# Patient Record
Sex: MALE | Race: WHITE
[De-identification: names, ages, dates, MRNs, and addresses within clinical notes are randomized per-mention and may not be internally consistent; named-entity substitution may affect disease eponyms.]

---

## 2019-01-12 ENCOUNTER — HOSPITAL ENCOUNTER (INPATIENT)
Dept: HOSPITAL 61 - ER | Age: 64
LOS: 4 days | Discharge: NURSING, REHAB OR LONG-TERM CARE | DRG: 895 | End: 2019-01-16
Attending: HOSPITALIST | Admitting: HOSPITALIST
Payer: MEDICARE

## 2019-01-12 VITALS — DIASTOLIC BLOOD PRESSURE: 78 MMHG | SYSTOLIC BLOOD PRESSURE: 148 MMHG

## 2019-01-12 VITALS — DIASTOLIC BLOOD PRESSURE: 79 MMHG | SYSTOLIC BLOOD PRESSURE: 147 MMHG

## 2019-01-12 VITALS — BODY MASS INDEX: 19.62 KG/M2 | HEIGHT: 67 IN | WEIGHT: 125 LBS

## 2019-01-12 DIAGNOSIS — F10.239: Primary | ICD-10-CM

## 2019-01-12 DIAGNOSIS — G93.41: ICD-10-CM
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
## 2019-01-12 DIAGNOSIS — Z72.0: ICD-10-CM

## 2019-01-12 DIAGNOSIS — E83.42: ICD-10-CM

## 2019-01-12 DIAGNOSIS — Z87.820: ICD-10-CM

## 2019-01-12 LAB
ACETAMIN: < 2 MCG/ML (ref 10–30)
ALBUMIN SERPL-MCNC: 4.1 G/DL (ref 3.4–5)
ALBUMIN/GLOB SERPL: 1 {RATIO} (ref 1–1.7)
ALP SERPL-CCNC: 73 U/L (ref 46–116)
ALT SERPL-CCNC: 26 U/L (ref 16–63)
AMPHETAMINE/METHAMPHETAMINE: (no result)
ANION GAP SERPL CALC-SCNC: 12 MMOL/L (ref 6–14)
APTT PPP: (no result) S
AST SERPL-CCNC: 44 U/L (ref 15–37)
BACTERIA #/AREA URNS HPF: 0 /HPF
BARBITURATES UR-MCNC: (no result) UG/ML
BASOPHILS # BLD AUTO: 0 X10^3/UL (ref 0–0.2)
BASOPHILS NFR BLD: 1 % (ref 0–3)
BENZODIAZ UR-MCNC: (no result) UG/L
BILIRUB SERPL-MCNC: 3.2 MG/DL (ref 0.2–1)
BILIRUB UR QL STRIP: (no result)
BUN SERPL-MCNC: 12 MG/DL (ref 8–26)
BUN/CREAT SERPL: 15 (ref 6–20)
CALCIUM SERPL-MCNC: 9.3 MG/DL (ref 8.5–10.1)
CANNABINOIDS UR-MCNC: (no result) UG/L
CHLORIDE SERPL-SCNC: 97 MMOL/L (ref 98–107)
CO2 SERPL-SCNC: 29 MMOL/L (ref 21–32)
COCAINE UR-MCNC: (no result) NG/ML
CREAT SERPL-MCNC: 0.8 MG/DL (ref 0.7–1.3)
EOSINOPHIL NFR BLD: 0.2 X10^3/UL (ref 0–0.7)
EOSINOPHIL NFR BLD: 3 % (ref 0–3)
ERYTHROCYTE [DISTWIDTH] IN BLOOD BY AUTOMATED COUNT: 13.5 % (ref 11.5–14.5)
FIBRINOGEN PPP-MCNC: CLEAR MG/DL
GFR SERPLBLD BASED ON 1.73 SQ M-ARVRAT: 97.3 ML/MIN
GLOBULIN SER-MCNC: 4.2 G/DL (ref 2.2–3.8)
GLUCOSE SERPL-MCNC: 79 MG/DL (ref 70–99)
HCT VFR BLD CALC: 48.8 % (ref 39–53)
HGB BLD-MCNC: 16.8 G/DL (ref 13–17.5)
HYALINE CASTS #/AREA URNS LPF: (no result) /HPF
LYMPHOCYTES # BLD: 0.9 X10^3/UL (ref 1–4.8)
LYMPHOCYTES NFR BLD AUTO: 14 % (ref 24–48)
MAGNESIUM SERPL-MCNC: 1.5 MG/DL (ref 1.8–2.4)
MCH RBC QN AUTO: 32 PG (ref 25–35)
MCHC RBC AUTO-ENTMCNC: 34 G/DL (ref 31–37)
MCV RBC AUTO: 94 FL (ref 79–100)
METHADONE SERPL-MCNC: (no result) NG/ML
MONO #: 0.8 X10^3/UL (ref 0–1.1)
MONOCYTES NFR BLD: 13 % (ref 0–9)
NEUT #: 4.6 X10^3UL (ref 1.8–7.7)
NEUTROPHILS NFR BLD AUTO: 70 % (ref 31–73)
NITRITE UR QL STRIP: NEGATIVE
OPIATES UR-MCNC: (no result) NG/ML
PCP SERPL-MCNC: (no result) MG/DL
PH UR STRIP: 5.5 [PH]
PLATELET # BLD AUTO: 278 X10^3/UL (ref 140–400)
POTASSIUM SERPL-SCNC: 4 MMOL/L (ref 3.5–5.1)
PROT SERPL-MCNC: 8.3 G/DL (ref 6.4–8.2)
PROT UR STRIP-MCNC: NEGATIVE MG/DL
RBC # BLD AUTO: 5.22 X10^6/UL (ref 4.3–5.7)
RBC #/AREA URNS HPF: 0 /HPF (ref 0–2)
SALIC: < 2.8 MG/DL (ref 2.8–20)
SODIUM SERPL-SCNC: 138 MMOL/L (ref 136–145)
SQUAMOUS #/AREA URNS LPF: (no result) /LPF
UROBILINOGEN UR-MCNC: 1 MG/DL
WBC # BLD AUTO: 6.6 X10^3/UL (ref 4–11)
WBC #/AREA URNS HPF: (no result) /HPF (ref 0–4)

## 2019-01-12 PROCEDURE — 87040 BLOOD CULTURE FOR BACTERIA: CPT

## 2019-01-12 PROCEDURE — 80329 ANALGESICS NON-OPIOID 1 OR 2: CPT

## 2019-01-12 PROCEDURE — G0480 DRUG TEST DEF 1-7 CLASSES: HCPCS

## 2019-01-12 PROCEDURE — 72125 CT NECK SPINE W/O DYE: CPT

## 2019-01-12 PROCEDURE — 70450 CT HEAD/BRAIN W/O DYE: CPT

## 2019-01-12 PROCEDURE — 84484 ASSAY OF TROPONIN QUANT: CPT

## 2019-01-12 PROCEDURE — 80053 COMPREHEN METABOLIC PANEL: CPT

## 2019-01-12 PROCEDURE — 96374 THER/PROPH/DIAG INJ IV PUSH: CPT

## 2019-01-12 PROCEDURE — 80076 HEPATIC FUNCTION PANEL: CPT

## 2019-01-12 PROCEDURE — G0378 HOSPITAL OBSERVATION PER HR: HCPCS

## 2019-01-12 PROCEDURE — 85025 COMPLETE CBC W/AUTO DIFF WBC: CPT

## 2019-01-12 PROCEDURE — 71045 X-RAY EXAM CHEST 1 VIEW: CPT

## 2019-01-12 PROCEDURE — 96361 HYDRATE IV INFUSION ADD-ON: CPT

## 2019-01-12 PROCEDURE — 80048 BASIC METABOLIC PNL TOTAL CA: CPT

## 2019-01-12 PROCEDURE — 80307 DRUG TEST PRSMV CHEM ANLYZR: CPT

## 2019-01-12 PROCEDURE — 83605 ASSAY OF LACTIC ACID: CPT

## 2019-01-12 PROCEDURE — 81001 URINALYSIS AUTO W/SCOPE: CPT

## 2019-01-12 PROCEDURE — 83735 ASSAY OF MAGNESIUM: CPT

## 2019-01-12 PROCEDURE — 90471 IMMUNIZATION ADMIN: CPT

## 2019-01-12 PROCEDURE — 90756 CCIIV4 VACC ABX FREE IM: CPT

## 2019-01-12 PROCEDURE — 36415 COLL VENOUS BLD VENIPUNCTURE: CPT

## 2019-01-12 PROCEDURE — 93005 ELECTROCARDIOGRAM TRACING: CPT

## 2019-01-12 RX ADMIN — MAGNESIUM OXIDE TAB 400 MG (241.3 MG ELEMENTAL MG) SCH MG: 400 (241.3 MG) TAB at 15:20

## 2019-01-12 NOTE — PHYS DOC
Adult General


Chief Complaint


Chief Complaint:  OTHER COMPLAINTS





HPI


HPI





63-year-old male presents to ER via EMS from local [facility] for complaints of 

generalized weakness. Patient is poor historian and during exam becomes tearful 

and provides minimal information on past history or why he is in the ER. Pt has 

strong smell of urine on him during initial exam. Patient reports he drinks 

vodka


And denies any past medical history or surgeries. Patient is alert to self and 

is aware of bleeding at [facility]. Patient has tremors in 

bilateral upper extremities which increased during exam as he becomes more 

anxious and tearful.





Review of Systems


Review of Systems





Constitutional: Denies fever


HENT: Denies head pain


Respiratory: Denies shortness of breath []


Cardiovascular: Denies CP


GI: Denies abdominal pain, vomiting


Musculoskeletal: Reports bodyaches


Neurologic: Denies dizziness








Poor historian





Current Medications


Current Medications





Current Medications








 Medications


  (Trade)  Dose


 Ordered  Sig/Jacques  Start Time


 Stop Time Status Last Admin


Dose Admin


 


 Ceftriaxone Sodium


  (Rocephin)  1 gm  1X  ONCE  1/12/19 15:15


 1/12/19 15:16 DC 1/12/19 15:20


1 GM


 


 Magnesium Oxide


  (Magnesium Oxide)  400 mg  DAILY  1/12/19 15:08


    1/12/19 15:20


400 MG


 


 Sodium Chloride  1,000 ml @ 


 1,000 mls/hr  1X  ONCE  1/12/19 15:15


 1/12/19 16:14 DC 1/12/19 15:20


1,000 MLS/HR











Allergies


Allergies





Allergies








Coded Allergies Type Severity Reaction Last Updated Verified


 


  No Known Drug Allergies    1/12/19 No











Physical Exam


Physical Exam





Constitutional: Well developed, well nourished, no acute distress, non-toxic 

appearance. Frail fatigued appearance. Strong smell of urine. 2 full and 

bilateral upper extremity tremors which increased during exam.


HENT: Normocephalic, atraumatic, bilateral external ears normal, mucous 

membranes pink/dry, nose normal. []


Eyes: 2mm PERRLA, no nystagmus, conjunctiva normal, no discharge. [] 


Neck: Normal range of motion, no tenderness, supple, no stridor. [] 


Cardiovascular: Heart rate regular rhythm, no murmur []


Lungs & Thorax:  Bilateral breath sounds clear to auscultation. Resp. equal/

nonlabored


Abdomen: Bowel sounds normal, soft, no tenderness


Skin: Cool, dry, no erythema, no rash. [] 


Back: No tenderness, no CVA tenderness. [] 


Extremities: No tenderness, skin bilateral lower extremities with cool skin, no 

clubbing, ROM intact, no edema. [] 


Neurologic: Alert and oriented X 2-unaware of time or year, normal motor 

function, normal sensory function, no focal deficits noted. The most bilateral 

upper extremities


Psychologic: Affect normal, judgement normal, tearful





Current Patient Data


Vital Signs





 Vital Signs








  Date Time  Temp Pulse Resp B/P (MAP) Pulse Ox O2 Delivery O2 Flow Rate FiO2


 


1/12/19 15:30  122 19 158/74 (102) 96 Room Air  


 


1/12/19 13:20 98.1       





 98.1       








Lab Values





 Laboratory Tests








Test


 1/12/19


13:30


 


White Blood Count


 6.6 x10^3/uL


(4.0-11.0)


 


Red Blood Count


 5.22 x10^6/uL


(4.30-5.70)


 


Hemoglobin


 16.8 g/dL


(13.0-17.5)


 


Hematocrit


 48.8 %


(39.0-53.0)


 


Mean Corpuscular Volume


 94 fL ()





 


Mean Corpuscular Hemoglobin 32 pg (25-35)  


 


Mean Corpuscular Hemoglobin


Concent 34 g/dL


(31-37)


 


Red Cell Distribution Width


 13.5 %


(11.5-14.5)


 


Platelet Count


 278 x10^3/uL


(140-400)


 


Neutrophils (%) (Auto) 70 % (31-73)  


 


Lymphocytes (%) (Auto) 14 % (24-48)  L


 


Monocytes (%) (Auto) 13 % (0-9)  H


 


Eosinophils (%) (Auto) 3 % (0-3)  


 


Basophils (%) (Auto) 1 % (0-3)  


 


Neutrophils # (Auto)


 4.6 x10^3uL


(1.8-7.7)


 


Lymphocytes # (Auto)


 0.9 x10^3/uL


(1.0-4.8)  L


 


Monocytes # (Auto)


 0.8 x10^3/uL


(0.0-1.1)


 


Eosinophils # (Auto)


 0.2 x10^3/uL


(0.0-0.7)


 


Basophils # (Auto)


 0.0 x10^3/uL


(0.0-0.2)


 


Sodium Level


 138 mmol/L


(136-145)


 


Potassium Level


 4.0 mmol/L


(3.5-5.1)


 


Chloride Level


 97 mmol/L


()  L


 


Carbon Dioxide Level


 29 mmol/L


(21-32)


 


Anion Gap 12 (6-14)  


 


Blood Urea Nitrogen


 12 mg/dL


(8-26)


 


Creatinine


 0.8 mg/dL


(0.7-1.3)


 


Estimated GFR


(Cockcroft-Gault) 97.3  





 


BUN/Creatinine Ratio 15 (6-20)  


 


Glucose Level


 79 mg/dL


(70-99)


 


Lactic Acid Level


 4.0 mmol/L


(0.4-2.0)  *H


 


Calcium Level


 9.3 mg/dL


(8.5-10.1)


 


Magnesium Level


 1.5 mg/dL


(1.8-2.4)  L


 


Total Bilirubin


 3.2 mg/dL


(0.2-1.0)  H


 


Aspartate Amino Transferase


(AST) 44 U/L (15-37)


H


 


Alanine Aminotransferase (ALT)


 26 U/L (16-63)





 


Alkaline Phosphatase


 73 U/L


()


 


Troponin I Quantitative


 < 0.017 ng/mL


(0.000-0.055)


 


Total Protein


 8.3 g/dL


(6.4-8.2)  H


 


Albumin


 4.1 g/dL


(3.4-5.0)


 


Albumin/Globulin Ratio 1.0 (1.0-1.7)  


 


Salicylates Level


 < 2.8 mg/dL


(2.8-20.0)  L


 


Salicylate Last Dose Date Unk  


 


Salicylate Last Dose Time Unk  


 


Acetaminophen Level


 < 2 mcg/ml


(10-30)  L


 


Acetaminophen Last Dose Date Unk  


 


Acetaminophen Last Dose Time Unk  


 


Ethyl Alcohol Level


 60 mg/dL


(0-10)  H





 Laboratory Tests


1/12/19 13:30








 Laboratory Tests


1/12/19 13:30











EKG


EKG


EKG obtained 01/12/19 at 1344


Interpreted by Dr. Davis





Sinus tachycardia


QTc 458


Rate 110


No STEMI





Radiology/Procedures


Radiology/Procedures


PROCEDURE: CHEST AP ONLY





CHEST AP ONLY


 


Clinical Indication: WEAKNESS


 


Comparison:  None.


 


Findings: 


Atherosclerotic aortic arch. Cardiac size is normal. Lungs are probably 


hyperexpanded. Lungs are clear. There is no pneumothorax. No pleural 


effusion is appreciated. No acute bone abnormality. There is old left 


lateral ninth rib fracture. Old right posterior lateral sixth rib 


fracture.


 


IMPRESSION: 


No acute cardiopulmonary process.


 


 


Electronically signed by: Ethan Amezcua MD (1/12/2019 1:43 PM) [facility]














DICTATED and SIGNED BY:     ETHAN AMEZCUA MD


DATE:     01/12/19 5495








PROCEDURE: CT HEAD AND CERVICAL SPINE [facility] Compliance Statement:


 


One or more of the following individualized dose reduction techniques were


utilized for this examination:  


1. Automated exposure control  


2. Adjustment of the mA and/or kV according to patient size  


3. Use of iterative reconstruction technique


 


 


CT HEAD AND CERVICAL SPINE WITHOUT CONTRAST


 


History: ams, weakness, poor historian.


 


Comparison: None.


 


Procedure: Axial images are obtained of the head from the skull base 


through the vertex without IV contrast. Noncontrast helical CT of the 


cervical spine was performed.  Axial, sagittal, and coronal 


reconstructions were obtained. 


 


Findings: 


The ventricles and sulci are prominent, consistent with age-related 


cerebral atrophy.  There is periventricular white matter hypoattenuation.


This is a nonspecific finding but is commonly due to chronic small vessel 


ischemic disease in a patient of this age.


 


No mass-effect, midline shift, hemorrhage or obvious acute infarction is 


identified.  Basilar cisterns are patent.  


 


Bone windows demonstrate no significant calvarial abnormality. 


The visualized paranasal sinuses are clear. Mastoid air cells are well 


aerated. 


 


There is no evidence of acute fracture or acute malalignment of the 


cervical spine. 


 


Craniovertebral junction is maintained. Hypertrophic facet joints. No 


perched or jumped facets. There is disc space narrowing and degenerative 


endplate spurring in the cervical spine. Uncinate process hypertrophy 


contributes to multilevel neural foraminal narrowing, some levels are 


severe.


 


Visualized soft tissues of the neck demonstrate no significant 


abnormalities. The visualized lung apices are clear.


 


IMPRESSION:


1.  No acute intracranial abnormality.  


2.  No acute fracture of the cervical spine.


 


Electronically signed by: Ethan Amezcua MD (1/12/2019 2:32 PM) [facility]














DICTATED and SIGNED BY:     ETHAN AMEZCUA MD


DATE:     01/12/19 1422





Course & Med Decision Making


Course & Med Decision Making


Pertinent Labs and Imaging studies reviewed. (See chart for details)





1500: Patient was brought to the ER via EMS from local [facility] and provides 

minimal information on past medical history or if having any current symptoms. 

Patient was tearful during initial exam and answered minimal questions. Patient 

had a head and C-spine CT which were negative for acute findings; chest x-ray 

no acute findings. EKG with sinus tachycardia no ischemic changes troponin <

0.017; WBCs normal limits at 6.6 with no bands lactic acid 4.0- 2L NS IV flds 

given. Blood cxs were obtained. Mg level at 1.5 provide oral replacement while 

in the ER. Total bilirubin 3.2 with patient reporting he is a daily drinker of 

vodka. Current alcohol level 60. With no source of infection found with current 

tests discussed pt's case and plan of care with Dr. Davis- will provide pt 

with dose of IV Rocephin and admit for further care/monitoring. Patient has 

been provided with lunch tray and is sitting up in bed eating. Patient 

continues to provide minimal past medical history he does deny allergies to 

medicine, daily medications, or past medical history uncertain of accuracy of 

this information. Patient is currently attempting to provide UA.





1530: Spoke with Dr. Vasquez, hospitalist and discussed pt's case, plan of care

, and admit plan. Will admit to their services for further care to the [facility] 

floor for further monitoring. Pt has had no seizure like activity while in ER 

or change in MS. Uncertain if pt has had previous hx of seizures with etoh 

withdrawal- he does continue to have tremors in bilat. upper extremities. 





Had requested UA be obtained prior to pt going to admit room- RN reported UA 

had not been obtained and that pt had been transported to  [address]. [facility] RN 

reports she advise accepting RN that UA was needed. 








Staff Physician Addendum:


I was working in the ER during the course of this patient's visit.  I was 

available for consultation as needed, but I was not directly involved in the 

care of this patient.





Dragon Disclaimer


Dragon Disclaimer


This electronic medical record was generated, in whole or in part, using a 

voice recognition dictation system.





Departure


Departure


Impression:  


 Primary Impression:  


 Weakness


 Additional Impression:  


 ETOH abuse


Disposition:  09 ADMITTED AS INPATIENT


Admitting Physician:  Other (Dr. Vasquez)


Condition:  STABLE





Problem Qualifiers











MARIKA AZEVEDO Jan 12, 2019 13:37


JEIMY DAVIS MD Jan 13, 2019 07:43

## 2019-01-12 NOTE — RAD
CHEST AP ONLY

 

Clinical Indication: WEAKNESS

 

Comparison:  None.

 

Findings: 

Atherosclerotic aortic arch. Cardiac size is normal. Lungs are probably 

hyperexpanded. Lungs are clear. There is no pneumothorax. No pleural 

effusion is appreciated. No acute bone abnormality. There is old left 

lateral ninth rib fracture. Old right posterior lateral sixth rib 

fracture.

 

IMPRESSION: 

No acute cardiopulmonary process.

 

 

Electronically signed by: Ethan Amezcua MD (1/12/2019 1:43 PM) [facility]
[employer] Compliance Statement:

 

One or more of the following individualized dose reduction techniques were

utilized for this examination:  

1. Automated exposure control  

2. Adjustment of the mA and/or kV according to patient size  

3. Use of iterative reconstruction technique

 

 

CT HEAD AND CERVICAL SPINE WITHOUT CONTRAST

 

History: ams, weakness, poor historian.

 

Comparison: None.

 

Procedure: Axial images are obtained of the head from the skull base 

through the vertex without IV contrast. Noncontrast helical CT of the 

cervical spine was performed.  Axial, sagittal, and coronal 

reconstructions were obtained. 

 

Findings: 

The ventricles and sulci are prominent, consistent with age-related 

cerebral atrophy.  There is periventricular white matter hypoattenuation.

This is a nonspecific finding but is commonly due to chronic small vessel 

ischemic disease in a patient of this age.

 

No mass-effect, midline shift, hemorrhage or obvious acute infarction is 

identified.  Basilar cisterns are patent.  

 

Bone windows demonstrate no significant calvarial abnormality. 

The visualized paranasal sinuses are clear. Mastoid air cells are well 

aerated. 

 

There is no evidence of acute fracture or acute malalignment of the 

cervical spine. 

 

Craniovertebral junction is maintained. Hypertrophic facet joints. No 

perched or jumped facets. There is disc space narrowing and degenerative 

endplate spurring in the cervical spine. Uncinate process hypertrophy 

contributes to multilevel neural foraminal narrowing, some levels are 

severe.

 

Visualized soft tissues of the neck demonstrate no significant 

abnormalities. The visualized lung apices are clear.

 

IMPRESSION:

1.  No acute intracranial abnormality.  

2.  No acute fracture of the cervical spine.

 

Electronically signed by: Ethan Amezcua MD (1/12/2019 2:32 PM) [facility]
no

## 2019-01-13 VITALS — DIASTOLIC BLOOD PRESSURE: 75 MMHG | SYSTOLIC BLOOD PRESSURE: 144 MMHG

## 2019-01-13 VITALS — DIASTOLIC BLOOD PRESSURE: 76 MMHG | SYSTOLIC BLOOD PRESSURE: 133 MMHG

## 2019-01-13 VITALS — DIASTOLIC BLOOD PRESSURE: 75 MMHG | SYSTOLIC BLOOD PRESSURE: 138 MMHG

## 2019-01-13 VITALS — SYSTOLIC BLOOD PRESSURE: 146 MMHG | DIASTOLIC BLOOD PRESSURE: 76 MMHG

## 2019-01-13 VITALS — SYSTOLIC BLOOD PRESSURE: 134 MMHG | DIASTOLIC BLOOD PRESSURE: 63 MMHG

## 2019-01-13 VITALS — DIASTOLIC BLOOD PRESSURE: 76 MMHG | SYSTOLIC BLOOD PRESSURE: 134 MMHG

## 2019-01-13 LAB
ALBUMIN SERPL-MCNC: 3.3 G/DL (ref 3.4–5)
ALP SERPL-CCNC: 58 U/L (ref 46–116)
ALT SERPL-CCNC: 19 U/L (ref 16–63)
ANION GAP SERPL CALC-SCNC: 11 MMOL/L (ref 6–14)
AST SERPL-CCNC: 28 U/L (ref 15–37)
BASOPHILS # BLD AUTO: 0 X10^3/UL (ref 0–0.2)
BASOPHILS NFR BLD: 0 % (ref 0–3)
BILIRUB DIRECT SERPL-MCNC: 0.2 MG/DL (ref 0–0.2)
BILIRUB SERPL-MCNC: 3.7 MG/DL (ref 0.2–1)
BUN SERPL-MCNC: 13 MG/DL (ref 8–26)
CALCIUM SERPL-MCNC: 9 MG/DL (ref 8.5–10.1)
CHLORIDE SERPL-SCNC: 100 MMOL/L (ref 98–107)
CO2 SERPL-SCNC: 27 MMOL/L (ref 21–32)
CREAT SERPL-MCNC: 0.7 MG/DL (ref 0.7–1.3)
EOSINOPHIL NFR BLD: 0.2 X10^3/UL (ref 0–0.7)
EOSINOPHIL NFR BLD: 2 % (ref 0–3)
ERYTHROCYTE [DISTWIDTH] IN BLOOD BY AUTOMATED COUNT: 13.6 % (ref 11.5–14.5)
GFR SERPLBLD BASED ON 1.73 SQ M-ARVRAT: 113.5 ML/MIN
GLUCOSE SERPL-MCNC: 75 MG/DL (ref 70–99)
HCT VFR BLD CALC: 39 % (ref 39–53)
HGB BLD-MCNC: 13.9 G/DL (ref 13–17.5)
LYMPHOCYTES # BLD: 0.7 X10^3/UL (ref 1–4.8)
LYMPHOCYTES NFR BLD AUTO: 8 % (ref 24–48)
MCH RBC QN AUTO: 33 PG (ref 25–35)
MCHC RBC AUTO-ENTMCNC: 36 G/DL (ref 31–37)
MCV RBC AUTO: 93 FL (ref 79–100)
MONO #: 0.9 X10^3/UL (ref 0–1.1)
MONOCYTES NFR BLD: 12 % (ref 0–9)
NEUT #: 6.1 X10^3UL (ref 1.8–7.7)
NEUTROPHILS NFR BLD AUTO: 77 % (ref 31–73)
PLATELET # BLD AUTO: 218 X10^3/UL (ref 140–400)
POTASSIUM SERPL-SCNC: 3.1 MMOL/L (ref 3.5–5.1)
PROT SERPL-MCNC: 6.5 G/DL (ref 6.4–8.2)
RBC # BLD AUTO: 4.18 X10^6/UL (ref 4.3–5.7)
SODIUM SERPL-SCNC: 138 MMOL/L (ref 136–145)
WBC # BLD AUTO: 7.9 X10^3/UL (ref 4–11)

## 2019-01-13 RX ADMIN — BACITRACIN SCH MLS/HR: 5000 INJECTION, POWDER, FOR SOLUTION INTRAMUSCULAR at 01:00

## 2019-01-13 RX ADMIN — MAGNESIUM OXIDE TAB 400 MG (241.3 MG ELEMENTAL MG) SCH MG: 400 (241.3 MG) TAB at 08:56

## 2019-01-13 RX ADMIN — FOLIC ACID SCH MLS/HR: 5 INJECTION, SOLUTION INTRAMUSCULAR; INTRAVENOUS; SUBCUTANEOUS at 14:15

## 2019-01-13 RX ADMIN — BACITRACIN SCH MLS/HR: 5000 INJECTION, POWDER, FOR SOLUTION INTRAMUSCULAR at 12:10

## 2019-01-13 NOTE — PDOC1
History and Physical


History of Present Illness


History of Present Illness


From ED: 63-year-old male presents to ER via EMS from local [facility] for 

complaints of generalized weakness. Patient is poor historian and during exam 

becomes tearful and provides minimal information on past history or why he is 

in the ER. Pt has strong smell of urine on him during initial exam. Patient 

reports he drinks vodka. And denies any past medical history or surgeries. 

Patient is alert to self and is aware of bleeding at [facility]. 

Patient has tremors in bilateral upper extremities which increased during exam 

as he becomes more anxious and tearful.








On my assessment pt is noted to have been sent to the ED after a wellness check 

after he was found to not leave the hotel for extended amts of time. He was 

found to have malodorous urine and low Mag in the ED and was given Rocephin and 

Mag. LA initially 4 and repeat was < 1. He is NAD but does not remember where 

he leaves or if/what medications he is taking.  ETOH level is 60 on admission. 

He has  h/o TBI as well. He is noted to have a persistent tremor in the RUE





Current Problem List


Problem List


Problems


Medical Problems:


(1) ETOH abuse


Status: Acute  











Current Medications


Current Medications





Current Medications








 Medications


  (Trade)  Dose


 Ordered  Sig/Jacques  Start Time


 Stop Time Status Last Admin


Dose Admin


 


 Ceftriaxone Sodium


  (Rocephin)  1 gm  1X  ONCE  1/12/19 15:15


 1/12/19 15:16 DC 1/12/19 15:20


1 GM


 


 Magnesium Oxide


  (Magnesium Oxide)  400 mg  DAILY  1/12/19 15:08


    1/13/19 08:56


400 MG


 


 Sodium Chloride  1,000 ml @ 


 75 mls/hr  [id number]  1/13/19 00:00


    1/13/19 12:10


75 MLS/HR











Allergies


Allergies





Allergies








Coded Allergies Type Severity Reaction Last Updated Verified


 


  No Known Drug Allergies    1/12/19 No











ROS


Review of System


CONSTITUTIONAL:        No fever or chills


EYES:                          No recent changes


SKIN:               No rash or itching


CARDIOVASCULAR:     No chest pain, syncope, palpitations, or edema


RESPIRATORY:            No SOB or cough


GASTROINTESTINAL:    No nausea, vomiting or abdominal pain


NEUROLOGICAL:          No headaches or weakness


ENDOCRINE:               No cold or heat intolerance


GENITOURINARY:         No urgency or frequency of urination


MUSCULOSKELETAL:   No back pain or joint pain


LYMPHATICS:               No enlarged lymph nodes


PSYCHIATRIC:              No anxiety or depression





Physical Exam


Physical Exam


GEN.:    No apparent distress.  Alert and oriented.


HEENT:    Head is normocephalic, atraumatic


NECK:    Supple.  


LUNGS:    Clear to auscultation.


HEART:    RRR, S1, S2 present.  Peripheral pulses intact


ABDOMEN:    Soft, nontender.  Positive bowel sounds.


EXTREMITIES:    Without any cyanosis.Tremor in RUE 


NEUROLOGIC:     Normal speech, normal tone


PSYCHIATRIC:    Normal affect, normal mood.


SKIN:   No ulcerations





Vitals


Vitals





Vital Signs








  Date Time  Temp Pulse Resp B/P (MAP) Pulse Ox O2 Delivery O2 Flow Rate FiO2


 


1/13/19 10:53 97.5 82 18 134/63 (86) 98 Nasal Cannula 2.0 





 97.5       











Labs


Labs





Laboratory Tests








Test


 1/12/19


13:30 1/12/19


19:15 1/13/19


00:00 1/13/19


01:30


 


White Blood Count


 6.6 x10^3/uL


(4.0-11.0) 


 


 7.9 x10^3/uL


(4.0-11.0)


 


Red Blood Count


 5.22 x10^6/uL


(4.30-5.70) 


 


 4.18 x10^6/uL


(4.30-5.70)


 


Hemoglobin


 16.8 g/dL


(13.0-17.5) 


 


 13.9 g/dL


(13.0-17.5)


 


Hematocrit


 48.8 %


(39.0-53.0) 


 


 39.0 %


(39.0-53.0)


 


Mean Corpuscular Volume 94 fL ()    93 fL () 


 


Mean Corpuscular Hemoglobin 32 pg (25-35)    33 pg (25-35) 


 


Mean Corpuscular Hemoglobin


Concent 34 g/dL


(31-37) 


 


 36 g/dL


(31-37)


 


Red Cell Distribution Width


 13.5 %


(11.5-14.5) 


 


 13.6 %


(11.5-14.5)


 


Platelet Count


 278 x10^3/uL


(140-400) 


 


 218 x10^3/uL


(140-400)


 


Neutrophils (%) (Auto) 70 % (31-73)    77 % (31-73) 


 


Lymphocytes (%) (Auto) 14 % (24-48)    8 % (24-48) 


 


Monocytes (%) (Auto) 13 % (0-9)    12 % (0-9) 


 


Eosinophils (%) (Auto) 3 % (0-3)    2 % (0-3) 


 


Basophils (%) (Auto) 1 % (0-3)    0 % (0-3) 


 


Neutrophils # (Auto)


 4.6 x10^3uL


(1.8-7.7) 


 


 6.1 x10^3uL


(1.8-7.7)


 


Lymphocytes # (Auto)


 0.9 x10^3/uL


(1.0-4.8) 


 


 0.7 x10^3/uL


(1.0-4.8)


 


Monocytes # (Auto)


 0.8 x10^3/uL


(0.0-1.1) 


 


 0.9 x10^3/uL


(0.0-1.1)


 


Eosinophils # (Auto)


 0.2 x10^3/uL


(0.0-0.7) 


 


 0.2 x10^3/uL


(0.0-0.7)


 


Basophils # (Auto)


 0.0 x10^3/uL


(0.0-0.2) 


 


 0.0 x10^3/uL


(0.0-0.2)


 


Sodium Level


 138 mmol/L


(136-145) 


 


 138 mmol/L


(136-145)


 


Potassium Level


 4.0 mmol/L


(3.5-5.1) 


 


 3.1 mmol/L


(3.5-5.1)


 


Chloride Level


 97 mmol/L


() 


 


 100 mmol/L


()


 


Carbon Dioxide Level


 29 mmol/L


(21-32) 


 


 27 mmol/L


(21-32)


 


Anion Gap 12 (6-14)    11 (6-14) 


 


Blood Urea Nitrogen


 12 mg/dL


(8-26) 


 


 13 mg/dL


(8-26)


 


Creatinine


 0.8 mg/dL


(0.7-1.3) 


 


 0.7 mg/dL


(0.7-1.3)


 


Estimated GFR


(Cockcroft-Gault) 97.3 


 


 


 113.5 





 


BUN/Creatinine Ratio 15 (6-20)    


 


Glucose Level


 79 mg/dL


(70-99) 


 


 75 mg/dL


(70-99)


 


Lactic Acid Level


 4.0 mmol/L


(0.4-2.0) 


 0.7 mmol/L


(0.4-2.0) 





 


Calcium Level


 9.3 mg/dL


(8.5-10.1) 


 


 9.0 mg/dL


(8.5-10.1)


 


Magnesium Level


 1.5 mg/dL


(1.8-2.4) 


 


 





 


Total Bilirubin


 3.2 mg/dL


(0.2-1.0) 


 


 





 


Aspartate Amino Transf


(AST/SGOT) 44 U/L (15-37) 


 


 


 





 


Alanine Aminotransferase


(ALT/SGPT) 26 U/L (16-63) 


 


 


 





 


Alkaline Phosphatase


 73 U/L


() 


 


 





 


Troponin I Quantitative


 < 0.017 ng/mL


(0.000-0.055) 


 


 





 


Total Protein


 8.3 g/dL


(6.4-8.2) 


 


 





 


Albumin


 4.1 g/dL


(3.4-5.0) 


 


 





 


Albumin/Globulin Ratio 1.0 (1.0-1.7)    


 


Salicylates Level


 < 2.8 mg/dL


(2.8-20.0) 


 


 





 


Salicylate Last Dose Date Unk    


 


Salicylate Last Dose Time Unk    


 


Acetaminophen Level


 < 2 mcg/ml


(10-30) 


 


 





 


Acetaminophen Last Dose Date Unk    


 


Acetaminophen Last Dose Time Unk    


 


Ethyl Alcohol Level


 60 mg/dL


(0-10) 


 


 





 


Urine Collection Type  Clean catch   


 


Urine Color  Dk yellow   


 


Urine Clarity  Clear   


 


Urine pH  5.5   


 


Urine Specific Gravity  1.025   


 


Urine Protein


 


 Negative mg/dL


(NEG-TRACE) 


 





 


Urine Glucose (UA)


 


 Negative mg/dL


(NEG) 


 





 


Urine Ketones (Stick)  15 mg/dL (NEG)   


 


Urine Blood  Negative (NEG)   


 


Urine Nitrite  Negative (NEG)   


 


Urine Bilirubin  Small (NEG)   


 


Urine Urobilinogen Dipstick


 


 1.0 mg/dL (0.2


mg/dL) 


 





 


Urine Leukocyte Esterase  Negative (NEG)   


 


Urine RBC  0 /HPF (0-2)   


 


Urine WBC


 


 Rare /HPF


(0-4) 


 





 


Urine Squamous Epithelial


Cells 


 Few /LPF 


 


 





 


Urine Bacteria  0 /HPF (0-FEW)   


 


Urine Hyaline Casts  Few /HPF   


 


Urine Mucus  Marked /LPF   


 


Urine Opiates Screen  Neg (NEG)   


 


Urine Methadone Screen  Neg (NEG)   


 


Urine Barbiturates  Neg (NEG)   


 


Urine Phencyclidine Screen  Neg (NEG)   


 


Urine


Amphetamine/Methamphetamine 


 Neg (NEG) 


 


 





 


Urine Benzodiazepines Screen  Neg (NEG)   


 


Urine Cocaine Screen  Neg (NEG)   


 


Urine Cannabinoids Screen  Neg (NEG)   


 


Urine Ethyl Alcohol  Pos (NEG)   








Laboratory Tests








Test


 1/12/19


13:30 1/12/19


19:15 1/13/19


00:00 1/13/19


01:30


 


White Blood Count


 6.6 x10^3/uL


(4.0-11.0) 


 


 7.9 x10^3/uL


(4.0-11.0)


 


Red Blood Count


 5.22 x10^6/uL


(4.30-5.70) 


 


 4.18 x10^6/uL


(4.30-5.70)


 


Hemoglobin


 16.8 g/dL


(13.0-17.5) 


 


 13.9 g/dL


(13.0-17.5)


 


Hematocrit


 48.8 %


(39.0-53.0) 


 


 39.0 %


(39.0-53.0)


 


Mean Corpuscular Volume 94 fL ()    93 fL () 


 


Mean Corpuscular Hemoglobin 32 pg (25-35)    33 pg (25-35) 


 


Mean Corpuscular Hemoglobin


Concent 34 g/dL


(31-37) 


 


 36 g/dL


(31-37)


 


Red Cell Distribution Width


 13.5 %


(11.5-14.5) 


 


 13.6 %


(11.5-14.5)


 


Platelet Count


 278 x10^3/uL


(140-400) 


 


 218 x10^3/uL


(140-400)


 


Neutrophils (%) (Auto) 70 % (31-73)    77 % (31-73) 


 


Lymphocytes (%) (Auto) 14 % (24-48)    8 % (24-48) 


 


Monocytes (%) (Auto) 13 % (0-9)    12 % (0-9) 


 


Eosinophils (%) (Auto) 3 % (0-3)    2 % (0-3) 


 


Basophils (%) (Auto) 1 % (0-3)    0 % (0-3) 


 


Neutrophils # (Auto)


 4.6 x10^3uL


(1.8-7.7) 


 


 6.1 x10^3uL


(1.8-7.7)


 


Lymphocytes # (Auto)


 0.9 x10^3/uL


(1.0-4.8) 


 


 0.7 x10^3/uL


(1.0-4.8)


 


Monocytes # (Auto)


 0.8 x10^3/uL


(0.0-1.1) 


 


 0.9 x10^3/uL


(0.0-1.1)


 


Eosinophils # (Auto)


 0.2 x10^3/uL


(0.0-0.7) 


 


 0.2 x10^3/uL


(0.0-0.7)


 


Basophils # (Auto)


 0.0 x10^3/uL


(0.0-0.2) 


 


 0.0 x10^3/uL


(0.0-0.2)


 


Sodium Level


 138 mmol/L


(136-145) 


 


 138 mmol/L


(136-145)


 


Potassium Level


 4.0 mmol/L


(3.5-5.1) 


 


 3.1 mmol/L


(3.5-5.1)


 


Chloride Level


 97 mmol/L


() 


 


 100 mmol/L


()


 


Carbon Dioxide Level


 29 mmol/L


(21-32) 


 


 27 mmol/L


(21-32)


 


Anion Gap 12 (6-14)    11 (6-14) 


 


Blood Urea Nitrogen


 12 mg/dL


(8-26) 


 


 13 mg/dL


(8-26)


 


Creatinine


 0.8 mg/dL


(0.7-1.3) 


 


 0.7 mg/dL


(0.7-1.3)


 


Estimated GFR


(Cockcroft-Gault) 97.3 


 


 


 113.5 





 


BUN/Creatinine Ratio 15 (6-20)    


 


Glucose Level


 79 mg/dL


(70-99) 


 


 75 mg/dL


(70-99)


 


Lactic Acid Level


 4.0 mmol/L


(0.4-2.0) 


 0.7 mmol/L


(0.4-2.0) 





 


Calcium Level


 9.3 mg/dL


(8.5-10.1) 


 


 9.0 mg/dL


(8.5-10.1)


 


Magnesium Level


 1.5 mg/dL


(1.8-2.4) 


 


 





 


Total Bilirubin


 3.2 mg/dL


(0.2-1.0) 


 


 





 


Aspartate Amino Transf


(AST/SGOT) 44 U/L (15-37) 


 


 


 





 


Alanine Aminotransferase


(ALT/SGPT) 26 U/L (16-63) 


 


 


 





 


Alkaline Phosphatase


 73 U/L


() 


 


 





 


Troponin I Quantitative


 < 0.017 ng/mL


(0.000-0.055) 


 


 





 


Total Protein


 8.3 g/dL


(6.4-8.2) 


 


 





 


Albumin


 4.1 g/dL


(3.4-5.0) 


 


 





 


Albumin/Globulin Ratio 1.0 (1.0-1.7)    


 


Salicylates Level


 < 2.8 mg/dL


(2.8-20.0) 


 


 





 


Salicylate Last Dose Date Unk    


 


Salicylate Last Dose Time Unk    


 


Acetaminophen Level


 < 2 mcg/ml


(10-30) 


 


 





 


Acetaminophen Last Dose Date Unk    


 


Acetaminophen Last Dose Time Unk    


 


Ethyl Alcohol Level


 60 mg/dL


(0-10) 


 


 





 


Urine Collection Type  Clean catch   


 


Urine Color  Dk yellow   


 


Urine Clarity  Clear   


 


Urine pH  5.5   


 


Urine Specific Gravity  1.025   


 


Urine Protein


 


 Negative mg/dL


(NEG-TRACE) 


 





 


Urine Glucose (UA)


 


 Negative mg/dL


(NEG) 


 





 


Urine Ketones (Stick)  15 mg/dL (NEG)   


 


Urine Blood  Negative (NEG)   


 


Urine Nitrite  Negative (NEG)   


 


Urine Bilirubin  Small (NEG)   


 


Urine Urobilinogen Dipstick


 


 1.0 mg/dL (0.2


mg/dL) 


 





 


Urine Leukocyte Esterase  Negative (NEG)   


 


Urine RBC  0 /HPF (0-2)   


 


Urine WBC


 


 Rare /HPF


(0-4) 


 





 


Urine Squamous Epithelial


Cells 


 Few /LPF 


 


 





 


Urine Bacteria  0 /HPF (0-FEW)   


 


Urine Hyaline Casts  Few /HPF   


 


Urine Mucus  Marked /LPF   


 


Urine Opiates Screen  Neg (NEG)   


 


Urine Methadone Screen  Neg (NEG)   


 


Urine Barbiturates  Neg (NEG)   


 


Urine Phencyclidine Screen  Neg (NEG)   


 


Urine


Amphetamine/Methamphetamine 


 Neg (NEG) 


 


 





 


Urine Benzodiazepines Screen  Neg (NEG)   


 


Urine Cocaine Screen  Neg (NEG)   


 


Urine Cannabinoids Screen  Neg (NEG)   


 


Urine Ethyl Alcohol  Pos (NEG)   











VTE Prophylaxis Ordered


VTE Prophylaxis Devices:  No


VTE Pharmacological Prophylaxi:  No





Assessment/Plan


Assessment/Plan


Plan: 


UA not convincing for UTI 


DC Rocephin 


IVF 


Mag prn 


Banana Bag 


CIWA al 


Social work c/s











BRITTANY DAVEY MD Jan 13, 2019 13:34

## 2019-01-14 VITALS — SYSTOLIC BLOOD PRESSURE: 127 MMHG | DIASTOLIC BLOOD PRESSURE: 71 MMHG

## 2019-01-14 VITALS — DIASTOLIC BLOOD PRESSURE: 92 MMHG | SYSTOLIC BLOOD PRESSURE: 127 MMHG

## 2019-01-14 VITALS — DIASTOLIC BLOOD PRESSURE: 80 MMHG | SYSTOLIC BLOOD PRESSURE: 140 MMHG

## 2019-01-14 VITALS — DIASTOLIC BLOOD PRESSURE: 87 MMHG | SYSTOLIC BLOOD PRESSURE: 144 MMHG

## 2019-01-14 VITALS — DIASTOLIC BLOOD PRESSURE: 72 MMHG | SYSTOLIC BLOOD PRESSURE: 129 MMHG

## 2019-01-14 VITALS — DIASTOLIC BLOOD PRESSURE: 50 MMHG | SYSTOLIC BLOOD PRESSURE: 124 MMHG

## 2019-01-14 VITALS — SYSTOLIC BLOOD PRESSURE: 132 MMHG | DIASTOLIC BLOOD PRESSURE: 85 MMHG

## 2019-01-14 RX ADMIN — BACITRACIN SCH MLS/HR: 5000 INJECTION, POWDER, FOR SOLUTION INTRAMUSCULAR at 03:00

## 2019-01-14 RX ADMIN — BACITRACIN SCH MLS/HR: 5000 INJECTION, POWDER, FOR SOLUTION INTRAMUSCULAR at 16:00

## 2019-01-14 RX ADMIN — MAGNESIUM OXIDE TAB 400 MG (241.3 MG ELEMENTAL MG) SCH MG: 400 (241.3 MG) TAB at 08:28

## 2019-01-14 RX ADMIN — FOLIC ACID SCH MLS/HR: 5 INJECTION, SOLUTION INTRAMUSCULAR; INTRAVENOUS; SUBCUTANEOUS at 09:00

## 2019-01-14 NOTE — PDOC
PROGRESS NOTES


Chief Complaint


Chief Complaint


weakness and debility


alcohol abuse,  acute EtOH withdrawl and encephalopathy


hypomag, hypo kalememia


acute metabolic encephalopathy


 


HR above 90,  cont thiamine and folate


OOB to chair


transfer off CV tele unit to 6





History of Present Illness


History of Present Illness


tremor at rest,   some PO intake





Vitals


Vitals





Vital Signs








  Date Time  Temp Pulse Resp B/P (MAP) Pulse Ox O2 Delivery O2 Flow Rate FiO2


 


1/14/19 08:15      Room Air  


 


1/14/19 07:00 97.5 78 18 129/72 (91) 95   





 97.5       


 


1/13/19 19:45       2.0 











Physical Exam


General:  Alert, Oriented X3, Cooperative, Other


Heart:  Normal S1, Normal S2


Lungs:  Wheezing


Abdomen:  Normal bowel sounds


Extremities:  No clubbing, No cyanosis


Skin:  No breakdown





Review of Systems


Review of Systems


nausea


weakness, tremor





Assessment and Plan


Assessmemt and Plan


Problems


Medical Problems:


(1) ETOH abuse


Status: Acute  











Comment


Review of Relevant


I have reviewed the following items dash (where applicable) has been applied.


Labs





Laboratory Tests








Test


 1/12/19


13:30 1/12/19


19:15 1/13/19


00:00 1/13/19


01:30


 


White Blood Count


 6.6 x10^3/uL


(4.0-11.0) 


 


 7.9 x10^3/uL


(4.0-11.0)


 


Red Blood Count


 5.22 x10^6/uL


(4.30-5.70) 


 


 4.18 x10^6/uL


(4.30-5.70)


 


Hemoglobin


 16.8 g/dL


(13.0-17.5) 


 


 13.9 g/dL


(13.0-17.5)


 


Hematocrit


 48.8 %


(39.0-53.0) 


 


 39.0 %


(39.0-53.0)


 


Mean Corpuscular Volume 94 fL ()    93 fL () 


 


Mean Corpuscular Hemoglobin 32 pg (25-35)    33 pg (25-35) 


 


Mean Corpuscular Hemoglobin


Concent 34 g/dL


(31-37) 


 


 36 g/dL


(31-37)


 


Red Cell Distribution Width


 13.5 %


(11.5-14.5) 


 


 13.6 %


(11.5-14.5)


 


Platelet Count


 278 x10^3/uL


(140-400) 


 


 218 x10^3/uL


(140-400)


 


Neutrophils (%) (Auto) 70 % (31-73)    77 % (31-73) 


 


Lymphocytes (%) (Auto) 14 % (24-48)    8 % (24-48) 


 


Monocytes (%) (Auto) 13 % (0-9)    12 % (0-9) 


 


Eosinophils (%) (Auto) 3 % (0-3)    2 % (0-3) 


 


Basophils (%) (Auto) 1 % (0-3)    0 % (0-3) 


 


Neutrophils # (Auto)


 4.6 x10^3uL


(1.8-7.7) 


 


 6.1 x10^3uL


(1.8-7.7)


 


Lymphocytes # (Auto)


 0.9 x10^3/uL


(1.0-4.8) 


 


 0.7 x10^3/uL


(1.0-4.8)


 


Monocytes # (Auto)


 0.8 x10^3/uL


(0.0-1.1) 


 


 0.9 x10^3/uL


(0.0-1.1)


 


Eosinophils # (Auto)


 0.2 x10^3/uL


(0.0-0.7) 


 


 0.2 x10^3/uL


(0.0-0.7)


 


Basophils # (Auto)


 0.0 x10^3/uL


(0.0-0.2) 


 


 0.0 x10^3/uL


(0.0-0.2)


 


Sodium Level


 138 mmol/L


(136-145) 


 


 138 mmol/L


(136-145)


 


Potassium Level


 4.0 mmol/L


(3.5-5.1) 


 


 3.1 mmol/L


(3.5-5.1)


 


Chloride Level


 97 mmol/L


() 


 


 100 mmol/L


()


 


Carbon Dioxide Level


 29 mmol/L


(21-32) 


 


 27 mmol/L


(21-32)


 


Anion Gap 12 (6-14)    11 (6-14) 


 


Blood Urea Nitrogen


 12 mg/dL


(8-26) 


 


 13 mg/dL


(8-26)


 


Creatinine


 0.8 mg/dL


(0.7-1.3) 


 


 0.7 mg/dL


(0.7-1.3)


 


Estimated GFR


(Cockcroft-Gault) 97.3 


 


 


 113.5 





 


BUN/Creatinine Ratio 15 (6-20)    


 


Glucose Level


 79 mg/dL


(70-99) 


 


 75 mg/dL


(70-99)


 


Lactic Acid Level


 4.0 mmol/L


(0.4-2.0) 


 0.7 mmol/L


(0.4-2.0) 





 


Calcium Level


 9.3 mg/dL


(8.5-10.1) 


 


 9.0 mg/dL


(8.5-10.1)


 


Magnesium Level


 1.5 mg/dL


(1.8-2.4) 


 


 





 


Total Bilirubin


 3.2 mg/dL


(0.2-1.0) 


 


 3.7 mg/dL


(0.2-1.0)


 


Aspartate Amino Transf


(AST/SGOT) 44 U/L (15-37) 


 


 


 28 U/L (15-37) 





 


Alanine Aminotransferase


(ALT/SGPT) 26 U/L (16-63) 


 


 


 19 U/L (16-63) 





 


Alkaline Phosphatase


 73 U/L


() 


 


 58 U/L


()


 


Troponin I Quantitative


 < 0.017 ng/mL


(0.000-0.055) 


 


 





 


Total Protein


 8.3 g/dL


(6.4-8.2) 


 


 6.5 g/dL


(6.4-8.2)


 


Albumin


 4.1 g/dL


(3.4-5.0) 


 


 3.3 g/dL


(3.4-5.0)


 


Albumin/Globulin Ratio 1.0 (1.0-1.7)    


 


Salicylates Level


 < 2.8 mg/dL


(2.8-20.0) 


 


 





 


Salicylate Last Dose Date Unk    


 


Salicylate Last Dose Time Unk    


 


Acetaminophen Level


 < 2 mcg/ml


(10-30) 


 


 





 


Acetaminophen Last Dose Date Unk    


 


Acetaminophen Last Dose Time Unk    


 


Ethyl Alcohol Level


 60 mg/dL


(0-10) 


 


 





 


Urine Collection Type  Clean catch   


 


Urine Color  Dk yellow   


 


Urine Clarity  Clear   


 


Urine pH  5.5   


 


Urine Specific Gravity  1.025   


 


Urine Protein


 


 Negative mg/dL


(NEG-TRACE) 


 





 


Urine Glucose (UA)


 


 Negative mg/dL


(NEG) 


 





 


Urine Ketones (Stick)  15 mg/dL (NEG)   


 


Urine Blood  Negative (NEG)   


 


Urine Nitrite  Negative (NEG)   


 


Urine Bilirubin  Small (NEG)   


 


Urine Urobilinogen Dipstick


 


 1.0 mg/dL (0.2


mg/dL) 


 





 


Urine Leukocyte Esterase  Negative (NEG)   


 


Urine RBC  0 /HPF (0-2)   


 


Urine WBC


 


 Rare /HPF


(0-4) 


 





 


Urine Squamous Epithelial


Cells 


 Few /LPF 


 


 





 


Urine Bacteria  0 /HPF (0-FEW)   


 


Urine Hyaline Casts  Few /HPF   


 


Urine Mucus  Marked /LPF   


 


Urine Opiates Screen  Neg (NEG)   


 


Urine Methadone Screen  Neg (NEG)   


 


Urine Barbiturates  Neg (NEG)   


 


Urine Phencyclidine Screen  Neg (NEG)   


 


Urine


Amphetamine/Methamphetamine 


 Neg (NEG) 


 


 





 


Urine Benzodiazepines Screen  Neg (NEG)   


 


Urine Cocaine Screen  Neg (NEG)   


 


Urine Cannabinoids Screen  Neg (NEG)   


 


Urine Ethyl Alcohol  Pos (NEG)   


 


Direct Bilirubin


 


 


 


 0.2 mg/dL


(0.0-0.2)








Microbiology


1/12/19 Blood Culture - Preliminary, Resulted


          NO GROWTH AFTER 1 DAY


Medications





Current Medications


Sodium Chloride 1,000 ml @  1,000 mls/hr 1X  ONCE IV  Last administered on 1/12/ 19at 14:05;  Start 1/12/19 at 13:30;  Stop 1/12/19 at 14:29;  Status DC


Sodium Chloride 1,000 ml @  1,000 mls/hr 1X  ONCE IV  Last administered on 1/12/ 19at 15:20;  Start 1/12/19 at 15:15;  Stop 1/12/19 at 16:14;  Status DC


Ceftriaxone Sodium (Rocephin) 1 gm 1X  ONCE IVP  Last administered on 1/12/19at 

15:20;  Start 1/12/19 at 15:15;  Stop 1/12/19 at 15:16;  Status DC


Magnesium Oxide (Magnesium Oxide) 400 mg DAILY PO  Last administered on 1/14/ 19at 08:28;  Start 1/12/19 at 15:08


Sodium Chloride 1,000 ml @  75 mls/hr [id number] IV  Last administered on 1/14/ 19at 03:00;  Start 1/13/19 at 00:00


Potassium Chloride (Klor-Con) 40 meq 1X  ONCE PO  Last administered on 1/13/ 19at 14:14;  Start 1/13/19 at 13:30;  Stop 1/13/19 at 13:31;  Status DC


Multivitamins 10 ml/Thiamine HCl 100 mg/Folic Acid 1 mg/Sodium Chloride 1,011.2 

ml  @ 100 mls/ hr DAILY IV  Last administered on 1/14/19at 09:00;  Start 1/13/ 19 at 14:00;  Stop 1/17/19 at 19:07


Multivitamins (Thera M Plus) 1 tab DAILY PO ;  Start 1/18/19 at 09:00


Folic Acid (Folic Acid) 1 mg DAILY PO ;  Start 1/18/19 at 09:00


Thiamine HCl 100 mg/Dextrose 51 ml @  100 mls/hr DAILY IV ;  Start 1/18/19 at 09

:00;  Stop 1/22/19 at 09:31


Lorazepam (Ativan) 4 mg PRN Q1HR  PRN PO For CIWA 8-14 Last administered on 1/13 /19at 14:15;  Start 1/13/19 at 13:30


Lorazepam (Ativan) 8 mg PRN Q1HR  PRN PO For CIWA 15 or greater;  Start 1/13/19 

at 13:30


Info (FLU VACCINE SCREEN per RX) 1 each PRN 1X  PRN MC SEE COMMENTS;  Start 1/14 /19 at 10:15;  Status UNV


Influenza Virus Vaccine (Afluria Trivalent 9449-2800 Syringe) 0.5 ml ONCE ONCE 

VAX IM ;  Start 1/14/19 at 10:30;  Stop 1/14/19 at 10:31;  Status DC


Potassium Chloride (Klor-Con) 40 meq 1X  ONCE PO ;  Start 1/14/19 at 11:00;  

Stop 1/14/19 at 11:01


Potassium Chloride (Klor-Con) 20 meq DAILYWBKFT PO ;  Start 1/15/19 at 08:00


Magnesium Sulfate/ Dextrose 100 ml @  25 mls/hr 1X  ONCE IV ;  Start 1/14/19 at 

11:00;  Stop 1/14/19 at 14:59


Lorazepam (Ativan) 1 mg BID PO ;  Start 1/14/19 at 11:00


Vitals/I & O





Vital Sign - Last 24 Hours








 1/13/19 1/13/19 1/13/19 1/13/19





 10:53 14:48 19:00 19:45


 


Temp 97.5 98.5 97.6 





 97.5 98.5 97.6 


 


Pulse 82 70 99 


 


Resp 18 18 20 


 


B/P (MAP) 134/63 (86) 146/76 (99) 133/76 (95) 


 


Pulse Ox 98 96 97 


 


O2 Delivery Nasal Cannula Nasal Cannula Room Air Nasal Cannula


 


O2 Flow Rate 2.0 2.0  2.0


 


    





    





 1/13/19 1/14/19 1/14/19 1/14/19





 23:45 03:45 07:00 08:15


 


Temp 97.8 98.2 97.5 





 97.8 98.2 97.5 


 


Pulse 77 83 78 


 


Resp 18 18 18 


 


B/P (MAP) 134/76 (95) 140/80 (100) 129/72 (91) 


 


Pulse Ox 96 97 95 


 


O2 Delivery Room Air Room Air Room Air Room Air














Intake and Output   


 


 1/13/19 1/13/19 1/14/19





 15:01 23:01 07:01


 


Intake Total 540 ml 100 ml 1300 ml


 


Output Total 350 ml  250 ml


 


Balance 190 ml 100 ml 1050 ml

















HEIDY BELL MD Jan 14, 2019 10:56

## 2019-01-14 NOTE — NUR
Per pt's request, LESLEE met with pt. Pt was concerned he left his TV at the motel he was 
staying. SW informed pt to call the motel to inquire about his TV. Pt also reported having 
Medicare insurance and is interested in [facility]. LESLEE notified Magdalena COELHO regarding insurance 
status. LESLEE will continue to follow.

## 2019-01-15 VITALS — SYSTOLIC BLOOD PRESSURE: 130 MMHG | DIASTOLIC BLOOD PRESSURE: 85 MMHG

## 2019-01-15 VITALS — SYSTOLIC BLOOD PRESSURE: 132 MMHG | DIASTOLIC BLOOD PRESSURE: 93 MMHG

## 2019-01-15 VITALS — SYSTOLIC BLOOD PRESSURE: 126 MMHG | DIASTOLIC BLOOD PRESSURE: 80 MMHG

## 2019-01-15 VITALS — DIASTOLIC BLOOD PRESSURE: 102 MMHG | SYSTOLIC BLOOD PRESSURE: 133 MMHG

## 2019-01-15 VITALS — DIASTOLIC BLOOD PRESSURE: 75 MMHG | SYSTOLIC BLOOD PRESSURE: 117 MMHG

## 2019-01-15 VITALS — SYSTOLIC BLOOD PRESSURE: 172 MMHG | DIASTOLIC BLOOD PRESSURE: 79 MMHG

## 2019-01-15 LAB
ANION GAP SERPL CALC-SCNC: 8 MMOL/L (ref 6–14)
BUN SERPL-MCNC: 6 MG/DL (ref 8–26)
CALCIUM SERPL-MCNC: 8.7 MG/DL (ref 8.5–10.1)
CHLORIDE SERPL-SCNC: 101 MMOL/L (ref 98–107)
CO2 SERPL-SCNC: 29 MMOL/L (ref 21–32)
CREAT SERPL-MCNC: 0.6 MG/DL (ref 0.7–1.3)
GFR SERPLBLD BASED ON 1.73 SQ M-ARVRAT: 135.6 ML/MIN
GLUCOSE SERPL-MCNC: 89 MG/DL (ref 70–99)
MAGNESIUM SERPL-MCNC: 1.7 MG/DL (ref 1.8–2.4)
POTASSIUM SERPL-SCNC: 3.8 MMOL/L (ref 3.5–5.1)
SODIUM SERPL-SCNC: 138 MMOL/L (ref 136–145)

## 2019-01-15 PROCEDURE — HZ31ZZZ INDIVIDUAL COUNSELING FOR SUBSTANCE ABUSE TREATMENT, BEHAVIORAL: ICD-10-PCS | Performed by: INTERNAL MEDICINE

## 2019-01-15 RX ADMIN — POTASSIUM CHLORIDE SCH MEQ: 1500 TABLET, EXTENDED RELEASE ORAL at 08:54

## 2019-01-15 RX ADMIN — MAGNESIUM OXIDE TAB 400 MG (241.3 MG ELEMENTAL MG) SCH MG: 400 (241.3 MG) TAB at 08:54

## 2019-01-15 RX ADMIN — FOLIC ACID SCH MLS/HR: 5 INJECTION, SOLUTION INTRAMUSCULAR; INTRAVENOUS; SUBCUTANEOUS at 08:56

## 2019-01-15 RX ADMIN — BACITRACIN SCH MLS/HR: 5000 INJECTION, POWDER, FOR SOLUTION INTRAMUSCULAR at 05:54

## 2019-01-15 RX ADMIN — NICOTINE SCH PATCH: 21 PATCH, EXTENDED RELEASE TOPICAL at 15:41

## 2019-01-15 RX ADMIN — BACITRACIN SCH MLS/HR: 5000 INJECTION, POWDER, FOR SOLUTION INTRAMUSCULAR at 21:05

## 2019-01-15 NOTE — PDOC
PROGRESS NOTES


Chief Complaint


Chief Complaint


Weakness





History of Present Illness


History of Present Illness


Patient seen and examined this morning.  Had a distinct smell of urine and his 

jeans were hanging off the side of the hospital bed.  Discussed that his choice 

of drink is vodka and he has struggle with alcoholism for some time.  Counseled 

patient on importance of cessation.





Vitals


Vitals





Vital Signs








  Date Time  Temp Pulse Resp B/P (MAP) Pulse Ox O2 Delivery O2 Flow Rate FiO2


 


1/15/19 11:00 97.9 69 18 126/80 (95) 94 Room Air  





 97.9       


 


1/15/19 08:00       2.0 











Physical Exam


General:  Alert, Oriented X3, Cooperative, Other


Heart:  Normal S1, Normal S2


Lungs:  Wheezing


Abdomen:  Normal bowel sounds


Extremities:  No clubbing, No cyanosis


Skin:  No breakdown





Labs


LABS





Laboratory Tests








Test


 1/15/19


05:28


 


Sodium Level


 138 mmol/L


(136-145)


 


Potassium Level


 3.8 mmol/L


(3.5-5.1)


 


Chloride Level


 101 mmol/L


()


 


Carbon Dioxide Level


 29 mmol/L


(21-32)


 


Anion Gap 8 (6-14) 


 


Blood Urea Nitrogen 6 mg/dL (8-26) 


 


Creatinine


 0.6 mg/dL


(0.7-1.3)


 


Estimated GFR


(Cockcroft-Gault) 135.6 





 


Glucose Level


 89 mg/dL


(70-99)


 


Calcium Level


 8.7 mg/dL


(8.5-10.1)


 


Magnesium Level


 1.7 mg/dL


(1.8-2.4)











Review of Systems


Review of Systems


Heart: denies chest pain


Lung: denies soa


GI: denies nausea/vomiting





Assessment and Plan


Assessmemt and Plan


Assessment:


1. Alcohol withdrawal


2. Tobacco use





Plan:


1. SNU eval, patient stated he desires to go to nursing home


2. Discharge disposition pending


3. Banana bag 


4.  home meds


5. PT/OT











Comment


Review of Relevant


I have reviewed the following items dash (where applicable) has been applied.


Labs





Laboratory Tests








Test


 1/15/19


05:28


 


Sodium Level


 138 mmol/L


(136-145)


 


Potassium Level


 3.8 mmol/L


(3.5-5.1)


 


Chloride Level


 101 mmol/L


()


 


Carbon Dioxide Level


 29 mmol/L


(21-32)


 


Anion Gap 8 (6-14) 


 


Blood Urea Nitrogen 6 mg/dL (8-26) 


 


Creatinine


 0.6 mg/dL


(0.7-1.3)


 


Estimated GFR


(Cockcroft-Gault) 135.6 





 


Glucose Level


 89 mg/dL


(70-99)


 


Calcium Level


 8.7 mg/dL


(8.5-10.1)


 


Magnesium Level


 1.7 mg/dL


(1.8-2.4)








Laboratory Tests








Test


 1/15/19


05:28


 


Sodium Level


 138 mmol/L


(136-145)


 


Potassium Level


 3.8 mmol/L


(3.5-5.1)


 


Chloride Level


 101 mmol/L


()


 


Carbon Dioxide Level


 29 mmol/L


(21-32)


 


Anion Gap 8 (6-14) 


 


Blood Urea Nitrogen 6 mg/dL (8-26) 


 


Creatinine


 0.6 mg/dL


(0.7-1.3)


 


Estimated GFR


(Cockcroft-Gault) 135.6 





 


Glucose Level


 89 mg/dL


(70-99)


 


Calcium Level


 8.7 mg/dL


(8.5-10.1)


 


Magnesium Level


 1.7 mg/dL


(1.8-2.4)








Microbiology


1/12/19 Blood Culture - Preliminary, Resulted


          NO GROWTH AFTER 2 DAYS


Medications





Current Medications


Sodium Chloride 1,000 ml @  1,000 mls/hr 1X  ONCE IV  Last administered on 1/12/ 19at 14:05;  Start 1/12/19 at 13:30;  Stop 1/12/19 at 14:29;  Status DC


Sodium Chloride 1,000 ml @  1,000 mls/hr 1X  ONCE IV  Last administered on 1/12/ 19at 15:20;  Start 1/12/19 at 15:15;  Stop 1/12/19 at 16:14;  Status DC


Ceftriaxone Sodium (Rocephin) 1 gm 1X  ONCE IVP  Last administered on 1/12/19at 

15:20;  Start 1/12/19 at 15:15;  Stop 1/12/19 at 15:16;  Status DC


Magnesium Oxide (Magnesium Oxide) 400 mg DAILY PO  Last administered on 1/15/

19at 08:54;  Start 1/12/19 at 15:08


Sodium Chloride 1,000 ml @  75 mls/hr [id number] IV  Last administered on 1/15/

19at 05:54;  Start 1/13/19 at 00:00


Potassium Chloride (Klor-Con) 40 meq 1X  ONCE PO  Last administered on 1/13/ 19at 14:14;  Start 1/13/19 at 13:30;  Stop 1/13/19 at 13:31;  Status DC


Multivitamins 10 ml/Thiamine HCl 100 mg/Folic Acid 1 mg/Sodium Chloride 1,011.2 

ml  @ 100 mls/ hr DAILY IV  Last administered on 1/15/19at 08:56;  Start 1/13/ 19 at 14:00;  Stop 1/17/19 at 19:07


Multivitamins (Thera M Plus) 1 tab DAILY PO ;  Start 1/18/19 at 09:00


Folic Acid (Folic Acid) 1 mg DAILY PO ;  Start 1/18/19 at 09:00


Thiamine HCl 100 mg/Dextrose 51 ml @  100 mls/hr DAILY IV ;  Start 1/18/19 at 09

:00;  Stop 1/22/19 at 09:31


Lorazepam (Ativan) 4 mg PRN Q1HR  PRN PO For CIWA 8-14 Last administered on 1/15

/19at 03:21;  Start 1/13/19 at 13:30


Lorazepam (Ativan) 8 mg PRN Q1HR  PRN PO For CIWA 15 or greater;  Start 1/13/19 

at 13:30


Info (FLU VACCINE SCREEN per RX) 1 each PRN 1X  PRN MC SEE COMMENTS;  Start 1/14 /19 at 10:15;  Status UNV


Influenza Virus Vaccine (Afluria Trivalent 9768-9213 Syringe) 0.5 ml ONCE ONCE 

VAX IM  Last administered on 1/14/19at 11:02;  Start 1/14/19 at 10:30;  Stop 1/ 14/19 at 10:31;  Status DC


Potassium Chloride (Klor-Con) 40 meq 1X  ONCE PO  Last administered on 1/14/ 19at 11:00;  Start 1/14/19 at 11:00;  Stop 1/14/19 at 11:01;  Status DC


Potassium Chloride (Klor-Con) 20 meq DAILYWBKFT PO  Last administered on 1/15/

19at 08:54;  Start 1/15/19 at 08:00


Magnesium Sulfate/ Dextrose 100 ml @  25 mls/hr 1X  ONCE IV  Last administered 

on 1/14/19at 11:00;  Start 1/14/19 at 11:00;  Stop 1/14/19 at 14:59;  Status DC


Lorazepam (Ativan) 1 mg BID PO  Last administered on 1/15/19at 08:54;  Start 1/ 14/19 at 11:00


Vitals/I & O





Vital Sign - Last 24 Hours








 1/14/19 1/14/19 1/14/19 1/14/19





 11:37 14:46 19:00 20:00


 


Temp 98.0 98.1 97.5 





 98.0 98.1 97.5 


 


Pulse 97 90 103 


 


Resp 17 20 21 


 


B/P (MAP) 144/87 (106) 127/92 (104) 124/50 (74) 


 


Pulse Ox 97 96 97 


 


O2 Delivery Room Air Room Air Room Air Room Air


 


O2 Flow Rate    2.0


 


    





    





 1/14/19 1/15/19 1/15/19 1/15/19





 23:00 03:00 07:15 08:00


 


Temp 98.6 97.5 97.8 





 98.6 97.5 97.8 


 


Pulse 90 81 66 


 


Resp 20 17 20 


 


B/P (MAP) 132/85 (101) 132/93 (106) 117/75 (89) 


 


Pulse Ox 97 95 94 


 


O2 Delivery Room Air Room Air Room Air Room Air


 


O2 Flow Rate    2.0


 


    





    





 1/15/19   





 11:00   


 


Temp 97.9   





 97.9   


 


Pulse 69   


 


Resp 18   


 


B/P (MAP) 126/80 (95)   


 


Pulse Ox 94   


 


O2 Delivery Room Air   














Intake and Output   


 


 1/14/19 1/14/19 1/15/19





 15:01 23:01 07:01


 


Intake Total 240 ml 860 ml 1960 ml


 


Output Total 650 ml  1700 ml


 


Balance -410 ml 860 ml 260 ml

















ALTAGRACIA JAQUEZ III DO Juarez 15, 2019 11:38

## 2019-01-15 NOTE — EKG
[facility]

              [address] [place] [address]

Test Date:    2019               Test Time:    13:44:07

Pat Name:     SHILO DAVIS          Department:   

Patient ID:   [id number]           Room:         3 1

Gender:                                Technician:   

:          1955               Requested By: JEIMY ASENCIO

Order Number: [id number]           Reading MD:   Nikolas Norman MD

                                 Measurements

Intervals                              Axis          

Rate:                                  P:            

OR:                                    QRS:          

QRSD:                                  T:            

QT:                                                  

QTc:                                                 

                           Interpretive Statements

SR

BASELINE ARTIFACT



Electronically Signed On 1- 12:04:04 CST by Nikolas Norman MD

## 2019-01-15 NOTE — NUR
Pt frequently getting out of bed, stating that "I am bored" and "I want a beer." Visable arm 
tremors noted and CIWA reassessed and scored at 15. Per protocol, Ativan 8mg PO, given. Will 
continue to monitor.

## 2019-01-15 NOTE — NUR
SW following pt. Discussed with pt regarding SNU options and the concern of a safe dc after 
SNU. Pt plans to go back to motel after rehab but is concerned about his belongings that are 
at the motel right now. SW informed him to ask a friend or family to help him with moving 
his belonging to motel. Pt agreeable with being screened at various facilities as most SNU 
do not discharge patients to motel after rehab. Pt aware insurance will have to approve SNU 
if he is accepted clinically at a facility and might have to go back to motel if there are 
no accepting facilities. SW phoned and faxed referral to [facility], [facility], [facility] and OP center. Pt 
admission and acceptance pending. SW will continue to follow.

## 2019-01-16 VITALS — SYSTOLIC BLOOD PRESSURE: 126 MMHG | DIASTOLIC BLOOD PRESSURE: 92 MMHG

## 2019-01-16 VITALS — DIASTOLIC BLOOD PRESSURE: 81 MMHG | SYSTOLIC BLOOD PRESSURE: 112 MMHG

## 2019-01-16 RX ADMIN — POTASSIUM CHLORIDE SCH MEQ: 1500 TABLET, EXTENDED RELEASE ORAL at 09:21

## 2019-01-16 RX ADMIN — NICOTINE SCH PATCH: 21 PATCH, EXTENDED RELEASE TOPICAL at 09:21

## 2019-01-16 RX ADMIN — FOLIC ACID SCH MLS/HR: 5 INJECTION, SOLUTION INTRAMUSCULAR; INTRAVENOUS; SUBCUTANEOUS at 09:20

## 2019-01-16 RX ADMIN — MAGNESIUM OXIDE TAB 400 MG (241.3 MG ELEMENTAL MG) SCH MG: 400 (241.3 MG) TAB at 09:21

## 2019-01-16 RX ADMIN — BACITRACIN SCH MLS/HR: 5000 INJECTION, POWDER, FOR SOLUTION INTRAMUSCULAR at 08:00

## 2019-01-16 NOTE — NUR
This RN called called [facility] twice to give report and was put on hold for 20 
minutes without an answer.

## 2019-01-16 NOTE — NUR
LESLEE following pt. LESLEE phoned and faxed orders to OP center. Pt will transport via facility 
arranged transport at 1600. Pt aware of plan and agreeable. LESELE notified pt's son, Vish 
about plan. Packet on chart. DYLAN LIU.

## 2019-01-16 NOTE — NUR
Insurance has approved SNU. Physician notified for orders. Pt aware of plan and agreeable. 
SW will await for dc orders and proceed accordingly.

## 2019-01-16 NOTE — PDOC
PROGRESS NOTES


Chief Complaint


Chief Complaint


Weakness





History of Present Illness


History of Present Illness


Patient seen and examined this morning. He was sitting in his chair drinking 

coffee, appeared to have good dexterity. He is looking forward to being 

discharged to a skilled nursing unit. Discussed that his choice of drink is 

vodka and he has struggle with alcoholism for some time.  Counseled patient on 

importance of cessation.





Vitals


Vitals





Vital Signs








  Date Time  Temp Pulse Resp B/P (MAP) Pulse Ox O2 Delivery O2 Flow Rate FiO2


 


1/16/19 09:22  90  112/81    


 


1/16/19 08:00      Room Air  


 


1/16/19 07:00 98.1  18  96   





 98.1       


 


1/15/19 08:00       2.0 











Physical Exam


General:  Alert, Oriented X3, Cooperative, No acute distress, Other


Heart:  Regular rate, Normal S1, Normal S2


Lungs:  Wheezing


Abdomen:  Normal bowel sounds


Extremities:  No clubbing, No cyanosis


Skin:  No breakdown





Review of Systems


Review of Systems


Heart: denies chest pain


Lung: denies soa


Integument: denies rash





Assessment and Plan


Assessmemt and Plan


Assessment


1. ETOH abuse  





Plan:


1. PT/OT


2. Home meds


3. ETOH withdrawal protocol


4. Discharge to SNU today


5. Discharge disposition pending








Comment


Review of Relevant


I have reviewed the following items dash (where applicable) has been applied.


Labs





Laboratory Tests








Test


 1/15/19


05:28


 


Sodium Level


 138 mmol/L


(136-145)


 


Potassium Level


 3.8 mmol/L


(3.5-5.1)


 


Chloride Level


 101 mmol/L


()


 


Carbon Dioxide Level


 29 mmol/L


(21-32)


 


Anion Gap 8 (6-14) 


 


Blood Urea Nitrogen 6 mg/dL (8-26) 


 


Creatinine


 0.6 mg/dL


(0.7-1.3)


 


Estimated GFR


(Cockcroft-Gault) 135.6 





 


Glucose Level


 89 mg/dL


(70-99)


 


Calcium Level


 8.7 mg/dL


(8.5-10.1)


 


Magnesium Level


 1.7 mg/dL


(1.8-2.4)








Microbiology


1/12/19 Blood Culture - Preliminary, Resulted


          NO GROWTH AFTER 3 DAYS


Medications





Current Medications


Sodium Chloride 1,000 ml @  1,000 mls/hr 1X  ONCE IV  Last administered on 1/12/ 19at 14:05;  Start 1/12/19 at 13:30;  Stop 1/12/19 at 14:29;  Status DC


Sodium Chloride 1,000 ml @  1,000 mls/hr 1X  ONCE IV  Last administered on 1/12/ 19at 15:20;  Start 1/12/19 at 15:15;  Stop 1/12/19 at 16:14;  Status DC


Ceftriaxone Sodium (Rocephin) 1 gm 1X  ONCE IVP  Last administered on 1/12/19at 

15:20;  Start 1/12/19 at 15:15;  Stop 1/12/19 at 15:16;  Status DC


Magnesium Oxide (Magnesium Oxide) 400 mg DAILY PO  Last administered on 1/16/ 19at 09:21;  Start 1/12/19 at 15:08


Sodium Chloride 1,000 ml @  75 mls/hr [id number] IV  Last administered on 1/15/

19at 21:05;  Start 1/13/19 at 00:00


Potassium Chloride (Klor-Con) 40 meq 1X  ONCE PO  Last administered on 1/13/ 19at 14:14;  Start 1/13/19 at 13:30;  Stop 1/13/19 at 13:31;  Status DC


Multivitamins 10 ml/Thiamine HCl 100 mg/Folic Acid 1 mg/Sodium Chloride 1,011.2 

ml  @ 100 mls/ hr DAILY IV  Last administered on 1/16/19at 09:20;  Start 1/13/ 19 at 14:00;  Stop 1/17/19 at 19:07


Multivitamins (Thera M Plus) 1 tab DAILY PO ;  Start 1/18/19 at 09:00


Folic Acid (Folic Acid) 1 mg DAILY PO ;  Start 1/18/19 at 09:00


Thiamine HCl 100 mg/Dextrose 51 ml @  100 mls/hr DAILY IV ;  Start 1/18/19 at 09

:00;  Stop 1/22/19 at 09:31


Lorazepam (Ativan) 4 mg PRN Q1HR  PRN PO For CIWA 8-14 Last administered on 1/16 /19at 12:03;  Start 1/13/19 at 13:30


Lorazepam (Ativan) 8 mg PRN Q1HR  PRN PO For CIWA 15 or greater Last 

administered on 1/15/19at 17:42;  Start 1/13/19 at 13:30


Info (FLU VACCINE SCREEN per RX) 1 each PRN 1X  PRN MC SEE COMMENTS;  Start 1/14 /19 at 10:15;  Status UNV


Influenza Virus Vaccine (Afluria Trivalent 2730-0289 Syringe) 0.5 ml ONCE ONCE 

VAX IM  Last administered on 1/14/19at 11:02;  Start 1/14/19 at 10:30;  Stop 1/ 14/19 at 10:31;  Status DC


Potassium Chloride (Klor-Con) 40 meq 1X  ONCE PO  Last administered on 1/14/ 19at 11:00;  Start 1/14/19 at 11:00;  Stop 1/14/19 at 11:01;  Status DC


Potassium Chloride (Klor-Con) 20 meq DAILYWBKFT PO  Last administered on 1/16/ 19at 09:21;  Start 1/15/19 at 08:00


Magnesium Sulfate/ Dextrose 100 ml @  25 mls/hr 1X  ONCE IV  Last administered 

on 1/14/19at 11:00;  Start 1/14/19 at 11:00;  Stop 1/14/19 at 14:59;  Status DC


Lorazepam (Ativan) 1 mg BID PO  Last administered on 1/16/19at 09:22;  Start 1/ 14/19 at 11:00


Amlodipine Besylate (Norvasc) 10 mg 1X  ONCE PO  Last administered on 1/15/19at 

15:40;  Start 1/15/19 at 15:30;  Stop 1/15/19 at 15:31;  Status DC


Amlodipine Besylate (Norvasc) 10 mg DAILY PO  Last administered on 1/16/19at 09:

22;  Start 1/16/19 at 09:00


Nicotine (Nicoderm Cq 21mg) 1 patch DAILY TD  Last administered on 1/16/19at 09:

21;  Start 1/15/19 at 15:30


Vitals/I & O





Vital Sign - Last 24 Hours








 1/15/19 1/15/19 1/15/19 1/15/19





 14:36 15:40 19:00 20:00


 


Temp 97.3  97.5 





 97.3  97.5 


 


Pulse 96 96 98 


 


Resp 22  18 


 


B/P (MAP) 172/79 (110) 172/79 133/102 (112) 


 


Pulse Ox 97  94 


 


O2 Delivery Room Air  Room Air Room Air


 


    





    





 1/15/19 1/16/19 1/16/19 1/16/19





 22:43 02:43 07:00 08:00


 


Temp 97.9 97.4 98.1 





 97.9 97.4 98.1 


 


Pulse 78 85 90 


 


Resp 17 18 18 


 


B/P (MAP) 130/85 (100) 126/92 (103) 112/81 (91) 


 


Pulse Ox 95 96 96 


 


O2 Delivery Room Air Room Air Room Air Room Air


 


    





    





 1/16/19   





 09:22   


 


Pulse 90   


 


B/P (MAP) 112/81   














Intake and Output   


 


 1/15/19 1/15/19 1/16/19





 15:01 23:01 07:01


 


Intake Total 700 ml 400 ml 


 


Output Total 400 ml 600 ml 450 ml


 


Balance 300 ml -200 ml -450 ml

















ALTAGRACIA JAQUEZ III DO Jan 16, 2019 12:56

## 2019-01-16 NOTE — DISCH
DISCHARGE


DISCHARGE INFORMATION:


FINAL DIAGNOSIS


Problems


Medical Problems:


(1) ETOH abuse


Status: Acute  








CONDITION ON DISCHARGE:  Stable





CODE STATUS:


Code Status:  Full





SKILLED NURSING:


SNF STAY <30 DAYS:  Yes





HOSPICE:


HOSPICE:  No


HOSPICE EVAL & TREAT:  No





LTAC:


ADMIT TO LTAC:  No





POST DISCHARGE ORDERS:


ACTIVITY ORDERS:  Resume previous activity


DIET AFTER DISCHARGE:  Cardiac





TREATMENT/EQUIPMENT ORDERS:


Physical Therapy For:  Evalulation/Treatment


Occupational Therapy For:  Evaluation/Treatment


Speech Language Pathology For:  Evaluation/Treatment











ALTAGRACIA JAQUEZ III, DO Jan 16, 2019 13:14

## 2019-02-06 NOTE — DS
DATE OF DISCHARGE:  01/16/2019



ADMISSION DIAGNOSES:  Alcohol abuse and weakness.



DISCHARGE DIAGNOSIS:  Resolving weakness.



HOSPITAL COURSE:  The patient is a pleasant 63-year-old male who drinks too much

and presented with weakness and debility.  He was admitted.  We gave him alcohol

withdrawal protocol, did physical therapy and occupational therapy.  Over the

next few days he did better.  We discharged to skilled.



DISPOSITION:  Skilled.



ACTIVITY:  As tolerated.



DIET:  Low sodium.



MEDICATIONS:  Please see the MRAD.



TOTAL TIME:  36 minutes.

 



______________________________

ALTAGRACIA JAQUEZ DO



DR:  RYAN/clare  JOB#:  [id number] / [id number]

DD:  02/06/2019 11:52  DT:  02/06/2019 12:05